# Patient Record
Sex: MALE | URBAN - METROPOLITAN AREA
[De-identification: names, ages, dates, MRNs, and addresses within clinical notes are randomized per-mention and may not be internally consistent; named-entity substitution may affect disease eponyms.]

---

## 2019-01-01 ENCOUNTER — APPOINTMENT (OUTPATIENT)
Dept: URBAN - METROPOLITAN AREA SURGERY 9 | Age: 84
Setting detail: DERMATOLOGY
End: 2019-01-01

## 2019-01-01 DIAGNOSIS — L82.0 INFLAMED SEBORRHEIC KERATOSIS: ICD-10-CM

## 2019-01-01 DIAGNOSIS — L82.1 OTHER SEBORRHEIC KERATOSIS: ICD-10-CM

## 2019-01-01 PROCEDURE — OTHER MIPS QUALITY: OTHER

## 2019-01-01 PROCEDURE — OTHER REASSURANCE: OTHER

## 2019-01-01 PROCEDURE — 99202 OFFICE O/P NEW SF 15 MIN: CPT | Mod: 25

## 2019-01-01 PROCEDURE — OTHER REFERRAL: OTHER

## 2019-01-01 PROCEDURE — 17111 DESTRUCT LESION 15 OR MORE: CPT

## 2019-01-01 PROCEDURE — OTHER LIQUID NITROGEN: OTHER

## 2019-01-01 PROCEDURE — 11102 TANGNTL BX SKIN SINGLE LES: CPT | Mod: 59

## 2019-01-01 PROCEDURE — 11103 TANGNTL BX SKIN EA SEP/ADDL: CPT

## 2019-01-01 PROCEDURE — OTHER BIOPSY BY SHAVE METHOD: OTHER

## 2019-01-01 PROCEDURE — 88305 TISSUE EXAM BY PATHOLOGIST: CPT | Mod: TC

## 2019-01-01 PROCEDURE — OTHER OTHER: OTHER

## 2019-01-01 PROCEDURE — OTHER PATHOLOGY BILLING: OTHER

## 2019-01-01 ASSESSMENT — LOCATION SIMPLE DESCRIPTION DERM
LOCATION SIMPLE: LEFT CHEEK
LOCATION SIMPLE: HAIR
LOCATION SIMPLE: LEFT FOREARM
LOCATION SIMPLE: LEFT EAR
LOCATION SIMPLE: LEFT SCALP
LOCATION SIMPLE: RIGHT FOREARM

## 2019-01-01 ASSESSMENT — LOCATION DETAILED DESCRIPTION DERM
LOCATION DETAILED: LEFT DISTAL DORSAL FOREARM
LOCATION DETAILED: HAIR
LOCATION DETAILED: LEFT SUPERIOR LATERAL BUCCAL CHEEK
LOCATION DETAILED: LEFT MEDIAL FRONTAL SCALP
LOCATION DETAILED: LEFT SUPERIOR HELIX
LOCATION DETAILED: LEFT CENTRAL MALAR CHEEK
LOCATION DETAILED: RIGHT DISTAL DORSAL FOREARM

## 2019-01-01 ASSESSMENT — LOCATION ZONE DERM
LOCATION ZONE: SCALP
LOCATION ZONE: FACE
LOCATION ZONE: EAR
LOCATION ZONE: ARM

## 2019-03-19 PROBLEM — E78.5 HYPERLIPIDEMIA, UNSPECIFIED: Status: ACTIVE | Noted: 2019-01-01

## 2019-03-19 PROBLEM — L82.1 OTHER SEBORRHEIC KERATOSIS: Status: ACTIVE | Noted: 2019-01-01

## 2019-03-19 PROBLEM — L82.0 INFLAMED SEBORRHEIC KERATOSIS: Status: ACTIVE | Noted: 2019-01-01

## 2019-03-19 PROBLEM — N18.6 END STAGE RENAL DISEASE: Status: ACTIVE | Noted: 2019-01-01

## 2019-03-19 PROBLEM — D48.5 NEOPLASM OF UNCERTAIN BEHAVIOR OF SKIN: Status: ACTIVE | Noted: 2019-01-01

## 2019-03-19 NOTE — PROCEDURE: PATHOLOGY BILLING
Immunohistochemistry (70094 and 12222) billing is not performed here. Please use the Immunohistochemistry Stain Billing plan to accomplish this. Immunohistochemistry (78623 and [id number]) billing is not performed here. Please use the Immunohistochemistry Stain Billing plan to accomplish this.

## 2019-03-19 NOTE — PROCEDURE: LIQUID NITROGEN
Duration Of Freeze Thaw-Cycle (Seconds): 20
Number Of Freeze-Thaw Cycles: 1 freeze-thaw cycle
Add 52 Modifier (Optional): no
Total Number Of Lesions Treated: 16
Detail Level: Zone
Post-care instructions reviewed in detail. May apply Vaseline to crusted or scabbed areas.
Render Post Care In The Note?: yes
Medical Necessity Clause: Medical necessity:
Consent: Informed consent obtained including but not limited to risks of pain, blistering, possibly permanent pigmentary change, recurrence and incomplete removal.
Medical Necessity Information: It is in your best interest to select a reason for this procedure from the list below. All of these items fulfill various CMS LCD requirements except the new and changing color options.

## 2019-03-19 NOTE — PROCEDURE: OTHER
Other (Free Text): 4cm in diameter, referring to otolaryngology/head and neck surgeon advised as deeper extension suspected. Discussed this may represent a primary tumor or a metastatic lesion. He reports no h/o internal malignancies.
Detail Level: Simple
Note Text (......Xxx Chief Complaint.): This diagnosis correlates with the

## 2019-03-19 NOTE — PROCEDURE: BIOPSY BY SHAVE METHOD
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)           Follow-ups after your visit        Your next 10 appointments already scheduled     Jun 01, 2017  8:30 AM CDT   Level 6 with BAY 4 INFUSION   [facility] ([facility]    [id number] [place] [address]   [phone]            Jun 01, 2017  4:00 PM CDT   New Treatment with RADIATION THERAPIST   [facility] ([facility])    [id number] [place] [address]   [phone]            Jun 01, 2017  4:30 PM CDT   on treatment visit with Saúl Pena MD   [facility] ([facility])    [address] [place] [address]   [phone]            Jun 02, 2017 11:15 AM CDT   TREATMENT with RADIATION THERAPIST   [facility] ([facility])    [address] [place] [address]   [phone]            Jun 02, 2017  2:40 PM CDT   (Arrive by 2:25 PM)   RETURN TUMOR VISIT with Alysia Kaiser MD   [facility] ([facility])    [address] [place] [address]   [phone]            Jun 05, 2017 11:15 AM CDT   (Arrive by 11:00 AM)   TREATMENT with RADIATION THERAPIST   [facility] ([facility])    [address] [place] [address]   [phone]            Jun 06, 2017  7:45 AM CDT   (Arrive by 7:30 AM)   TREATMENT with RADIATION THERAPIST   [facility] ([facility])    [address] [place] [address]  [facility] [address]   [phone]            Jun 07, 2017 12:00 PM CDT   (Arrive by 11:45 AM)   TREATMENT with RADIATION THERAPIST   [facility]    [address] [place] [address]   [phone]            Jun 08, 2017 12:00 PM CDT   (Arrive by 11:45 AM)   TREATMENT with RADIATION THERAPIST   [facility]    [address] [place] [address]   [phone]            Jun 08, 2017 12:15 PM CDT   on treatment visit with Kell Sheldon MD   [facility]    [address] [place] [address]   [phone]              Who to contact     If you have questions or need follow up information about today's clinic visit or your schedule please contact [facility] directly at [phone].  Normal or non-critical lab and imaging results will be communicated to you by bttnhart, letter or phone within 4 business days after the clinic has received the results. If you do not hear from us within 7 days, please contact the clinic through [email or web address] or phone. If you have a critical or abnormal lab result, we will notify you by phone as soon as possible.  Submit refill requests through [employer] or call your pharmacy and they will forward the refill request to us. Please allow 3 business days for your refill to be completed.          Additional Information About Your Visit        bttnhart Information     [employer] gives you secure access to your electronic health record. If you see a primary care provider, you can also send messages to your care team and make appointments. If you have questions, please call your primary care clinic.  If you do not have a primary care provider, please call [phone] and they will assist you.      [employer] is an electronic gateway that provides easy, online access to your medical  records. With [employer], you can request a clinic appointment, read your test results, renew a prescription or communicate with your care team.     To access your existing account, please contact your [facility] or call [phone] for assistance.        Care EveryWhere ID     This is your Care EveryWhere ID. This could be used by other organizations to access your [place] medical records  [id number]         Blood Pressure from Last 3 Encounters:   05/13/17 (!) 127/95   05/04/17 108/68   05/01/17 126/89    Weight from Last 3 Encounters:   05/13/17 51.2 kg (112 lb 12.8 oz)   05/04/17 53.6 kg (118 lb 1.6 oz)   05/03/17 52.2 kg (115 lb)              We Performed the Following     CBC with platelets differential     Comprehensive metabolic panel     Magnesium        Primary Care Provider Office Phone # Fax #    Jose Manuel ARAUZ Stan [phone]       [facility] [address] [facility] [address]        Thank you!     Thank you for choosing [facility]  for your care. Our goal is always to provide you with excellent care. Hearing back from our patients is one way we can continue to improve our services. Please take a few minutes to complete the written survey that you may receive in the mail after your visit with us. Thank you!             Your Updated Medication List - Protect others around you: Learn how to safely use, store and throw away your medicines at www.disposemymeds.org.          This list is accurate as of: 6/1/17  8:29 AM.  Always use your most recent med list.                   Brand Name Dispense Instructions for use    * acetaminophen 32 mg/mL solution    TYLENOL    473 mL    20.3 mLs (650 mg) by Per NG tube route every 4 hours as needed for mild pain or fever       * acetaminophen 325 MG tablet    TYLENOL    100 tablet    Take 2 tablets (650 mg) by mouth once as needed for mild pain (to moderate pain)       * acetaminophen 32 mg/mL solution     TYLENOL    120 mL    Take 20.3 mLs (650 mg) by mouth every 6 hours as needed for fever or mild pain       albuterol (2.5 MG/3ML) 0.083% neb solution     360 mL    Take 1 vial (2.5 mg) by nebulization every 4 hours as needed for shortness of breath / dyspnea or wheezing       chlorhexidine 0.12 % solution    PERIDEX    473 mL    Swish and spit 15 mLs in mouth every 8 hours       dexamethasone 4 MG tablet    DECADRON    6 tablet    Take 2 tablets (8 mg) by mouth daily (with breakfast) for 3 days Start the day after chemotherapy.       LORazepam 0.5 MG tablet    ATIVAN    30 tablet    Take 1 tablet (0.5 mg) by mouth every 4 hours as needed (Anxiety, Nausea/Vomiting or Sleep)       multivitamins with minerals Liqd liquid     1 Bottle    15 mLs by Per Feeding Tube route daily       * order for DME     14 days    Equipment being ordered:  Portable suction machine  14 French suction catheters 12 French red lim catheters  Diagnosis: squamous cell carcinoma of the oral cavity       * order for DME     14 days    Equipment being ordered: Tracheostomy supplies  0.9% sodium chloride 1000 mL bottles Box split 4x4 gauze sponges Trach kits with brushes Velcro trach ties 3 cc 0.9% sodium chloride lavages for trach suctioning Large gloves Cool mist humidity via trach dome  Diagnosis: s/p tracheostomy, squamous cell carcinoma of the oral cavity       * order for DME     14 days    Equipment being ordered: Nasogastric bolus tube feeding supplies Formula: Isosource 1.5, 5 cans per day [place] feeding bags 60 mL syringes  Treatment Diagnosis: squamous cell carcinoma of the oral cavity       * order for DME     1 each    Equipment being ordered: Other: nebulizer compressor with supplies Treatment Diagnosis: mucous plugs with trach       * oxyCODONE 5 MG/5ML solution    ROXICODONE    500 mL    5-15 mLs (5-15 mg) by Per Feeding Tube route every 3 hours as needed for moderate to severe pain       * oxyCODONE 5 MG/5ML solution     X Size Of Lesion In Cm: 0 ROXICODONE    120 mL    Take 5-10 mLs (5-10 mg) by mouth 4 times daily as needed for pain       prochlorperazine 10 MG tablet    COMPAZINE    30 tablet    Take 1 tablet (10 mg) by mouth every 6 hours as needed (Nausea/Vomiting)       sodium chloride 3 % Nebu neb solution     100 mL    Take 3 mLs by nebulization every 4 hours (while awake)       * Notice:  This list has 9 medication(s) that are the same as other medications prescribed for you. Read the directions carefully, and ask your doctor or other care provider to review them with you.